# Patient Record
Sex: FEMALE | Race: WHITE | HISPANIC OR LATINO | URBAN - METROPOLITAN AREA
[De-identification: names, ages, dates, MRNs, and addresses within clinical notes are randomized per-mention and may not be internally consistent; named-entity substitution may affect disease eponyms.]

---

## 2021-12-16 ENCOUNTER — APPOINTMENT (RX ONLY)
Dept: URBAN - METROPOLITAN AREA CLINIC 15 | Facility: CLINIC | Age: 26
Setting detail: DERMATOLOGY
End: 2021-12-16

## 2021-12-16 VITALS — HEIGHT: 72 IN | WEIGHT: 155 LBS

## 2021-12-16 DIAGNOSIS — Z41.9 ENCOUNTER FOR PROCEDURE FOR PURPOSES OTHER THAN REMEDYING HEALTH STATE, UNSPECIFIED: ICD-10-CM

## 2021-12-16 PROCEDURE — ? RESTYLANE KYSSE INJECTION

## 2021-12-16 NOTE — HPI: COSMETIC (FILLERS)
Have You Had Fillers Before?: has not had fillers
Additional History: Patient is in office for Restylane Kysse 1 syringe for lips quoted $675.

## 2021-12-16 NOTE — PROCEDURE: RESTYLANE KYSSE INJECTION
Additional Area 1 Volume In Cc: 0
Post-Care Instructions: Patient instructed to apply ice to reduce swelling.
Anesthesia Volume In Cc: 1
Map Statement: See [address] for Complete Details
Use Map Statement For Sites (Optional): No
Lot #: [id number]
Anesthesia Type: 2% lidocaine with epinephrine and a 1:12 solution of 8.4% sodium bicarbonate
Filler: Restylane Kysse
Vermilion Lips Filler Volume In Cc: 0.7
Procedural Text: The filler was administered to the treatment areas noted above.
Detail Level: Zone
Additional Area 1 Location: Lower lip
Consent: Written consent obtained. Risks include but not limited to bruising, beading, irregular texture, ulceration, infection, allergic reaction, scar formation, incomplete augmentation, temporary nature, procedural pain.
Additional Area 3 Location: dorsal nose
Expiration Date (Month Year): 2023-03-31
Price (Use Numbers Only, No Special Characters Or $): [address] [place] [address]

## 2021-12-30 ENCOUNTER — APPOINTMENT (RX ONLY)
Dept: URBAN - METROPOLITAN AREA CLINIC 15 | Facility: CLINIC | Age: 26
Setting detail: DERMATOLOGY
End: 2021-12-30

## 2021-12-30 VITALS — WEIGHT: 155 LBS | HEIGHT: 72 IN

## 2021-12-30 DIAGNOSIS — Z41.9 ENCOUNTER FOR PROCEDURE FOR PURPOSES OTHER THAN REMEDYING HEALTH STATE, UNSPECIFIED: ICD-10-CM

## 2021-12-30 PROCEDURE — ? COSMETIC FOLLOW-UP

## 2021-12-30 NOTE — PROCEDURE: COSMETIC FOLLOW-UP
Price (Use Numbers Only, No Special Characters Or $): 0
Detail Level: Zone
Comments (Free Text): Patient concern about small bumps on lips. Patient reassure no bumps visible.  F/U in 4-6 weeks

## 2022-02-02 ENCOUNTER — APPOINTMENT (RX ONLY)
Dept: URBAN - METROPOLITAN AREA CLINIC 15 | Facility: CLINIC | Age: 27
Setting detail: DERMATOLOGY
End: 2022-02-02

## 2022-02-02 VITALS — WEIGHT: 155 LBS | HEIGHT: 72 IN

## 2022-02-02 DIAGNOSIS — Z41.9 ENCOUNTER FOR PROCEDURE FOR PURPOSES OTHER THAN REMEDYING HEALTH STATE, UNSPECIFIED: ICD-10-CM

## 2022-02-02 PROCEDURE — ? COSMETIC FOLLOW-UP

## 2022-02-02 NOTE — HPI: COSMETIC FOLLOW UP
How Did You Tolerate The Procedure?: well, without problems
What Condition Are We Treating?: Lips fillers
What Procedure Did We Perform At The Last Visit?: Restylane kysse injections\\n\\nPatient refused to take pics

## 2022-02-02 NOTE — PROCEDURE: COSMETIC FOLLOW-UP
Price (Use Numbers Only, No Special Characters Or $): 0
Detail Level: Zone
Comments (Free Text): .\\nPatient concern about small bumps on lips. Patient reassure no bumps visible. F/U in 4 weeks. \\n\\nPatient was given 2 options to dissolve product today, wait 2 weeks and add mor Restylane Kysse or wait 1 month, massage slightly the lips with Vaseline and dissolved if no improvement. Patient also has more product left from first treatment.

## 2022-03-09 ENCOUNTER — APPOINTMENT (RX ONLY)
Dept: URBAN - METROPOLITAN AREA CLINIC 15 | Facility: CLINIC | Age: 27
Setting detail: DERMATOLOGY
End: 2022-03-09

## 2022-03-09 VITALS — HEIGHT: 72 IN | WEIGHT: 155 LBS

## 2022-03-09 DIAGNOSIS — Z41.9 ENCOUNTER FOR PROCEDURE FOR PURPOSES OTHER THAN REMEDYING HEALTH STATE, UNSPECIFIED: ICD-10-CM

## 2022-03-09 PROCEDURE — ? ADDITIONAL NOTES

## 2022-03-09 PROCEDURE — ? FILLERS

## 2022-03-09 ASSESSMENT — LOCATION SIMPLE DESCRIPTION DERM
LOCATION SIMPLE: RIGHT LIP
LOCATION SIMPLE: LEFT LIP

## 2022-03-09 ASSESSMENT — LOCATION ZONE DERM: LOCATION ZONE: LIP

## 2022-03-09 ASSESSMENT — LOCATION DETAILED DESCRIPTION DERM
LOCATION DETAILED: LEFT INFERIOR VERMILION LIP
LOCATION DETAILED: RIGHT SUPERIOR VERMILION LIP
LOCATION DETAILED: LEFT SUPERIOR VERMILION LIP

## 2022-03-09 NOTE — PROCEDURE: FILLERS
Brows Filler  Volume In Cc: 0
Additional Area 4 Location: corner of the mouth
Filler: Restylane Kysse
Detail Level: Zone
Additional Area 1 Location: lower face
Include Cannula Information In Note?: No
Lot #: [id number]
Additional Area 2 Location: chin
Vermilion Lips Filler Volume In Cc: 0.2
Expiration Date (Month Year): 2023-02-09
Lot #: [id number]
Additional Area 4 Location: nose
Post-Care Instructions: Patient instructed to apply ice to reduce swelling and return with any issues.
Expiration Date (Month Year): 2023-03-31
Consent: Written consent obtained. Risks include but not limited to bruising, beading, irregular texture, ulceration, infection, allergic reaction, scar formation, incomplete augmentation, temporary nature, procedural pain.
Map Statment: See [address] for Complete Details
Lot #: [id number]
Additional Area 1 Location: lips
Use Map Statement For Sites (Optional): Yes
Expiration Date (Month Year): 2024-03-15
Additional Area 1 Location: [place] form

## 2022-03-09 NOTE — PROCEDURE: ADDITIONAL NOTES
Additional Notes: .\\n\\nPatient used . 2 of filler of what she had left.
Render Risk Assessment In Note?: no
Detail Level: Zone

## 2022-05-03 ENCOUNTER — APPOINTMENT (RX ONLY)
Dept: URBAN - METROPOLITAN AREA CLINIC 15 | Facility: CLINIC | Age: 27
Setting detail: DERMATOLOGY
End: 2022-05-03

## 2022-05-03 VITALS — WEIGHT: 155 LBS | HEIGHT: 72 IN

## 2022-05-03 DIAGNOSIS — Z41.9 ENCOUNTER FOR PROCEDURE FOR PURPOSES OTHER THAN REMEDYING HEALTH STATE, UNSPECIFIED: ICD-10-CM

## 2022-05-03 PROCEDURE — ? COSMETIC FOLLOW-UP

## 2022-05-03 ASSESSMENT — LOCATION DETAILED DESCRIPTION DERM
LOCATION DETAILED: LEFT SUPERIOR VERMILION LIP
LOCATION DETAILED: RIGHT INFERIOR VERMILION LIP
LOCATION DETAILED: LEFT INFERIOR VERMILION LIP
LOCATION DETAILED: RIGHT SUPERIOR VERMILION LIP

## 2022-05-03 ASSESSMENT — LOCATION SIMPLE DESCRIPTION DERM
LOCATION SIMPLE: RIGHT LIP
LOCATION SIMPLE: LEFT LIP

## 2022-05-03 ASSESSMENT — LOCATION ZONE DERM: LOCATION ZONE: LIP

## 2022-05-03 NOTE — PROCEDURE: COSMETIC FOLLOW-UP
Price (Use Numbers Only, No Special Characters Or $): 0
Detail Level: Zone
Comments (Free Text): .\\nPatient is to wait for few months for more for re- evaluation of Restalyne Kysse to the lips.